# Patient Record
Sex: MALE | Race: OTHER | HISPANIC OR LATINO | URBAN - METROPOLITAN AREA
[De-identification: names, ages, dates, MRNs, and addresses within clinical notes are randomized per-mention and may not be internally consistent; named-entity substitution may affect disease eponyms.]

---

## 2019-06-15 ENCOUNTER — EMERGENCY (EMERGENCY)
Facility: HOSPITAL | Age: 39
LOS: 1 days | Discharge: HOME | End: 2019-06-15
Attending: EMERGENCY MEDICINE | Admitting: EMERGENCY MEDICINE
Payer: SELF-PAY

## 2019-06-15 VITALS
OXYGEN SATURATION: 100 % | RESPIRATION RATE: 18 BRPM | SYSTOLIC BLOOD PRESSURE: 137 MMHG | TEMPERATURE: 98 F | HEART RATE: 89 BPM | DIASTOLIC BLOOD PRESSURE: 79 MMHG

## 2019-06-15 VITALS
SYSTOLIC BLOOD PRESSURE: 145 MMHG | DIASTOLIC BLOOD PRESSURE: 90 MMHG | OXYGEN SATURATION: 97 % | TEMPERATURE: 98 F | RESPIRATION RATE: 16 BRPM | HEART RATE: 85 BPM

## 2019-06-15 PROCEDURE — 71046 X-RAY EXAM CHEST 2 VIEWS: CPT | Mod: 26

## 2019-06-15 PROCEDURE — 93010 ELECTROCARDIOGRAM REPORT: CPT

## 2019-06-15 PROCEDURE — 99284 EMERGENCY DEPT VISIT MOD MDM: CPT | Mod: 25

## 2019-06-15 RX ORDER — IPRATROPIUM/ALBUTEROL SULFATE 18-103MCG
3 AEROSOL WITH ADAPTER (GRAM) INHALATION ONCE
Refills: 0 | Status: COMPLETED | OUTPATIENT
Start: 2019-06-15 | End: 2019-06-15

## 2019-06-15 RX ORDER — ACETAMINOPHEN 500 MG
650 TABLET ORAL ONCE
Refills: 0 | Status: COMPLETED | OUTPATIENT
Start: 2019-06-15 | End: 2019-06-15

## 2019-06-15 RX ORDER — ALBUTEROL 90 UG/1
1 AEROSOL, METERED ORAL ONCE
Refills: 0 | Status: COMPLETED | OUTPATIENT
Start: 2019-06-15 | End: 2019-06-15

## 2019-06-15 RX ADMIN — Medication 650 MILLIGRAM(S): at 22:16

## 2019-06-15 RX ADMIN — Medication 3 MILLILITER(S): at 22:26

## 2019-06-15 RX ADMIN — Medication 3 MILLILITER(S): at 21:30

## 2019-06-15 RX ADMIN — Medication 650 MILLIGRAM(S): at 21:30

## 2019-06-15 NOTE — ED ADULT NURSE NOTE - OBJECTIVE STATEMENT
PT received into room 8 A&Ox4, ambulatory C/O Chest pain with deep inspiration & cough x 3 days. PT states cough is dry/ non productive. CP reproducible on palpation, mild expiratory wheeze auscultated. Denies fevers, chills. Md evaluated, VS as noted. Medicated as ordered. Peak flow measured: 420. Call bell annamarie reach, comfort measures provided, will continue to monitor for safety and comfort.

## 2019-06-15 NOTE — ED PROVIDER NOTE - CLINICAL SUMMARY MEDICAL DECISION MAKING FREE TEXT BOX
CP and SOB likely related to URI with acute bronchitis/reactive airways. EKG normal. Plan: trial of nebs, CXR

## 2019-06-15 NOTE — ED PROVIDER NOTE - PROGRESS NOTE DETAILS
CXR neg EKG normal. Some improvement after nebs. No wheezing.   DC on ventolin inhaler. F/U PCP [facility]TER if worse

## 2019-06-15 NOTE — ED ADULT TRIAGE NOTE - CHIEF COMPLAINT QUOTE
Pt st " I have chest pain for couple of days...comes and goes...I been coughing got sick after the rain ." Resp even and unlabored. no cough through out triage.

## 2019-06-15 NOTE — ED PROVIDER NOTE - OBJECTIVE STATEMENT
3oM no PMH/meds c/o 5 day h/o cough assoc with headaches body aches and for last 2-3 days chest tightness and sob, pain with cough and deep inspiration. No fever, no sputum, no leg swelling. No smoking. Review of Symptoms in all systems otherwise negative, except as indicated

## 2019-06-15 NOTE — ED PROVIDER NOTE - PHYSICAL EXAMINATION
some ant chest wall tenderness to palpation, worse with touch, cough some ant chest wall tenderness to palpation, worse with touch, cough    peak flow 440

## 2019-06-15 NOTE — ED PROVIDER NOTE - NSFOLLOWUPINSTRUCTIONS_ED_ALL_ED_FT
You have acute bronchitis.  Chest Xray was clear  Rest, drink plenty of liquids  For pain take tylenol 650mg every 6 hours  Use Albuterol spray as shown 1 puff every 6 hours  Follow up with your PCP  Return if you get worse

## 2019-06-16 RX ADMIN — ALBUTEROL 1 PUFF(S): 90 AEROSOL, METERED ORAL at 00:23

## 2023-04-13 NOTE — ED ADULT NURSE NOTE - NSIMPLEMENTINTERV_GEN_ALL_ED
Implemented All Universal Safety Interventions:  [place] to call system. Call bell, personal items and telephone within reach. Instruct patient to call for assistance. Room bathroom lighting operational. Non-slip footwear when patient is off stretcher. Physically safe environment: no spills, clutter or unnecessary equipment. Stretcher in lowest position, wheels locked, appropriate side rails in place.
None